# Patient Record
Sex: FEMALE | Race: WHITE | Employment: OTHER | URBAN - METROPOLITAN AREA
[De-identification: names, ages, dates, MRNs, and addresses within clinical notes are randomized per-mention and may not be internally consistent; named-entity substitution may affect disease eponyms.]

---

## 2017-09-18 DIAGNOSIS — Z30.41 SURVEILLANCE OF PREVIOUSLY PRESCRIBED CONTRACEPTIVE PILL: ICD-10-CM

## 2017-09-18 NOTE — TELEPHONE ENCOUNTER
Future Appointments  Date Time Provider Charline Vegai   10/2/2017 9:30 AM Sofía Hoffman MD [facility] [address]      LOV 8/16 Return in about 1 year (around 8/30/2017).        LAST PAP  NONE    LAST RX    Norgestimate-Eth Estradiol 0.25-35 MG-MCG Oral

## 2017-09-19 DIAGNOSIS — Z30.41 SURVEILLANCE OF PREVIOUSLY PRESCRIBED CONTRACEPTIVE PILL: ICD-10-CM

## 2017-09-19 RX ORDER — NORGESTIMATE AND ETHINYL ESTRADIOL 0.25-0.035
1 KIT ORAL DAILY
Qty: 1 PACKAGE | Refills: 0 | Status: SHIPPED | OUTPATIENT
Start: 2017-09-19 | End: 2017-10-02

## 2017-09-19 RX ORDER — NORGESTIMATE AND ETHINYL ESTRADIOL 0.25-0.035
1 KIT ORAL DAILY
Qty: 84 TABLET | Refills: 0 | OUTPATIENT
Start: 2017-09-19 | End: 2017-10-19

## 2017-09-19 NOTE — TELEPHONE ENCOUNTER
Rx request to change today's Rx to 90 days      Denied, pt has upcoming appt, will receive additional 90 days at that time    Future Appointments  Date Time Provider Charline Oropeza   10/2/2017 9:30 AM Dom Meredith MD [facility] [address]

## 2017-10-02 ENCOUNTER — OFFICE VISIT (OUTPATIENT)
Dept: FAMILY MEDICINE CLINIC | Facility: CLINIC | Age: 27
End: 2017-10-02

## 2017-10-02 VITALS
WEIGHT: 209 LBS | BODY MASS INDEX: 33.19 KG/M2 | HEIGHT: 66.5 IN | RESPIRATION RATE: 18 BRPM | SYSTOLIC BLOOD PRESSURE: 104 MMHG | DIASTOLIC BLOOD PRESSURE: 66 MMHG | HEART RATE: 86 BPM | TEMPERATURE: 99 F

## 2017-10-02 DIAGNOSIS — Z23 NEED FOR VACCINATION: ICD-10-CM

## 2017-10-02 DIAGNOSIS — Z01.419 ENCOUNTER FOR ROUTINE GYNECOLOGICAL EXAMINATION WITH PAPANICOLAOU SMEAR OF CERVIX: ICD-10-CM

## 2017-10-02 DIAGNOSIS — Z30.41 SURVEILLANCE OF PREVIOUSLY PRESCRIBED CONTRACEPTIVE PILL: ICD-10-CM

## 2017-10-02 DIAGNOSIS — M79.671 FOOT PAIN, BILATERAL: ICD-10-CM

## 2017-10-02 DIAGNOSIS — M79.672 FOOT PAIN, BILATERAL: ICD-10-CM

## 2017-10-02 DIAGNOSIS — E66.9 OBESITY (BMI 30.0-34.9): ICD-10-CM

## 2017-10-02 DIAGNOSIS — Z00.00 ROUTINE GENERAL MEDICAL EXAMINATION AT A HEALTH CARE FACILITY: Primary | ICD-10-CM

## 2017-10-02 PROCEDURE — 99395 PREV VISIT EST AGE 18-39: CPT | Performed by: FAMILY MEDICINE

## 2017-10-02 PROCEDURE — 88175 CYTOPATH C/V AUTO FLUID REDO: CPT | Performed by: FAMILY MEDICINE

## 2017-10-02 PROCEDURE — 99212 OFFICE O/P EST SF 10 MIN: CPT | Performed by: FAMILY MEDICINE

## 2017-10-02 PROCEDURE — 90471 IMMUNIZATION ADMIN: CPT | Performed by: FAMILY MEDICINE

## 2017-10-02 PROCEDURE — 90686 IIV4 VACC NO PRSV 0.5 ML IM: CPT | Performed by: FAMILY MEDICINE

## 2017-10-02 RX ORDER — NORGESTIMATE AND ETHINYL ESTRADIOL 0.25-0.035
1 KIT ORAL DAILY
Qty: 1 PACKAGE | Refills: 11 | Status: SHIPPED | OUTPATIENT
Start: 2017-10-02 | End: 2018-09-09

## 2017-10-02 NOTE — PATIENT INSTRUCTIONS
please use over the counter monistat. Prevention Guidelines, Women Ages 25 to 44  Screening tests and vaccines are an important part of managing your health. Health counseling is essential, too. Below are guidelines for these, for women ages 25 to 44.  Destiny Arnlod Toy Gum Who needs it How often   Chickenpox (varicella) All women in this age group who have no record of this infection or vaccine 2 doses; the second dose should be given 4 to 8 weeks after the first dose   Hepatitis A Women at increased risk for infect BRCA gene mutation testing for breast and ovarian cancer susceptibility Women with increased risk for having gene mutation When your risk is known   Breast cancer and chemoprevention Women at high risk for breast cancer When your risk is known   Diet and e A gram of fat has almost 2.5 times the calories of a gram of protein or carbohydrates. Try to balance your food choices so that only 20% to 35% of your calories comes from total fat.  This means an average of 2½ to 3½ grams of fat for each 100 calories you

## 2017-10-02 NOTE — PROGRESS NOTES
Merlin Valentino is a 32year old female here for Patient presents with: Well Adult: Physical only.     HPI:   Patient is seen for initial visit    Patient is seen for annual physical, pap done last year was normal  Does do self breast exam.    Needs a refil tenderness. Endocrine: No thyroid symptoms (hair, nails, weight, GI, concentration, depression). No history or symptoms of diabetes, no polyuria, polydipsia, polyphagia).   Respiratory: No cough, shortness of breath, dyspnea on exertion, wheezing, hemoptys white discharge, cervix appears normal, pap done, bimanual no CMT, no adnexal mass or tenderness. MUSCULOSKELETAL: Back and extremities within normal limits  EXTREMITIES: no cyanosis, clubbing or edema.  Peripheral pulses normal.  NEURO: Nonfocal exam. Yamil

## 2018-09-09 ENCOUNTER — TELEPHONE (OUTPATIENT)
Dept: FAMILY MEDICINE CLINIC | Facility: CLINIC | Age: 28
End: 2018-09-09

## 2018-09-09 DIAGNOSIS — Z30.41 SURVEILLANCE OF PREVIOUSLY PRESCRIBED CONTRACEPTIVE PILL: ICD-10-CM

## 2018-09-10 RX ORDER — NORGESTIMATE AND ETHINYL ESTRADIOL 0.25-0.035
KIT ORAL
Qty: 28 TABLET | Refills: 11 | Status: SHIPPED | OUTPATIENT
Start: 2018-09-10 | End: 2018-10-19

## 2018-09-10 NOTE — TELEPHONE ENCOUNTER
Name from pharmacy: [facility]         Will file in chart as: Romayne Heron 0.25-35 MG-MCG Oral Tab    Sig: TAKE 1 TABLET DAILY    Disp:  28 tablet    Refills:  0    Start: 9/9/2018     Class: Normal    For: Surveillance of previously prescribed co

## 2018-09-20 DIAGNOSIS — Z30.41 SURVEILLANCE OF PREVIOUSLY PRESCRIBED CONTRACEPTIVE PILL: ICD-10-CM

## 2018-09-21 RX ORDER — NORGESTIMATE AND ETHINYL ESTRADIOL 0.25-0.035
KIT ORAL
Qty: 28 TABLET | Refills: 0 | OUTPATIENT
Start: 2018-09-21

## 2018-09-21 NOTE — TELEPHONE ENCOUNTER
Name from pharmacy: [address] [place] [address]          Will file in chart as: Lorena Yang 0.25-35 MG-MCG Oral Tab    The source prescription was discontinued on 10/2/2017 by Maribell Mcfadden MD.    Sig: TAKE 1 TABLET BY MOUTH DAILY    Disp:  28 tablet    Refil

## 2018-09-22 DIAGNOSIS — Z30.41 SURVEILLANCE OF PREVIOUSLY PRESCRIBED CONTRACEPTIVE PILL: ICD-10-CM

## 2018-09-22 RX ORDER — NORGESTIMATE AND ETHINYL ESTRADIOL 0.25-0.035
KIT ORAL
Qty: 28 TABLET | Refills: 0 | Status: SHIPPED | OUTPATIENT
Start: 2018-09-22 | End: 2018-10-19

## 2018-09-22 NOTE — TELEPHONE ENCOUNTER
Patient called states [facility] told her they do not have a refill for her when he went to pick it up. Prescription was approved on 9/10/18 per record. Prescription resent today.

## 2018-10-19 ENCOUNTER — OFFICE VISIT (OUTPATIENT)
Dept: FAMILY MEDICINE CLINIC | Facility: CLINIC | Age: 28
End: 2018-10-19
Payer: COMMERCIAL

## 2018-10-19 VITALS
TEMPERATURE: 98 F | SYSTOLIC BLOOD PRESSURE: 118 MMHG | OXYGEN SATURATION: 98 % | DIASTOLIC BLOOD PRESSURE: 62 MMHG | WEIGHT: 197.5 LBS | HEIGHT: 66.5 IN | RESPIRATION RATE: 18 BRPM | HEART RATE: 97 BPM | BODY MASS INDEX: 31.36 KG/M2

## 2018-10-19 DIAGNOSIS — Z30.41 SURVEILLANCE OF PREVIOUSLY PRESCRIBED CONTRACEPTIVE PILL: ICD-10-CM

## 2018-10-19 DIAGNOSIS — E66.9 OBESITY (BMI 30.0-34.9): ICD-10-CM

## 2018-10-19 DIAGNOSIS — Z23 NEED FOR VACCINATION: ICD-10-CM

## 2018-10-19 DIAGNOSIS — Z00.00 ROUTINE GENERAL MEDICAL EXAMINATION AT A HEALTH CARE FACILITY: Primary | ICD-10-CM

## 2018-10-19 PROBLEM — M79.672 FOOT PAIN, BILATERAL: Status: RESOLVED | Noted: 2017-10-02 | Resolved: 2018-10-19

## 2018-10-19 PROBLEM — M79.671 FOOT PAIN, BILATERAL: Status: RESOLVED | Noted: 2017-10-02 | Resolved: 2018-10-19

## 2018-10-19 PROCEDURE — 90686 IIV4 VACC NO PRSV 0.5 ML IM: CPT | Performed by: FAMILY MEDICINE

## 2018-10-19 PROCEDURE — 90471 IMMUNIZATION ADMIN: CPT | Performed by: FAMILY MEDICINE

## 2018-10-19 PROCEDURE — 99395 PREV VISIT EST AGE 18-39: CPT | Performed by: FAMILY MEDICINE

## 2018-10-19 RX ORDER — NORGESTIMATE AND ETHINYL ESTRADIOL 0.25-0.035
1 KIT ORAL
Qty: 28 TABLET | Refills: 11 | Status: SHIPPED | OUTPATIENT
Start: 2018-10-19 | End: 2019-09-21

## 2018-10-19 NOTE — PROGRESS NOTES
Jessica Puri is a 25year old female here for Patient presents with: Well Adult: Physical only. HPI:   Patient is seen for her annual physical.  Pap done last year was normal    Needs a refill for her OCP.  Periods are regular and has no side effects symptoms (hair, nails, weight, GI, concentration, depression). No history or symptoms of diabetes, no polyuria, polydipsia, polyphagia). Respiratory: No cough, shortness of breath, dyspnea on exertion, wheezing, hemoptysis.   Cardiovascular: No heart palpi normal.  NEURO: Nonfocal exam. Oriented times three,cranial nerves are intact,motor and sensory are grossly intact, speech normal, DTR's equal bilaterally. ASSESSMENT AND PLAN:   Pedro Galloway was seen today for well adult.     Diagnoses and all orders for [facility]

## 2018-10-19 NOTE — PATIENT INSTRUCTIONS
Prevention Guidelines, Women Ages 25 to 44  Screening tests and vaccines are an important part of managing your health. A screening test is done to find possible disorders or diseases in people who don't have any symptoms.  The goal is to find a disease e Type 2 diabetes All women with prediabetes Every year   Gonorrhea Sexually active women at increased risk for infection At routine exams   Hepatitis C Anyone at increased risk At routine exams   HIV All women should be tested at least once for HIV between Meningococcal Women at increased risk for infection should talk with their healthcare provider 1 or more doses   Pneumococcal conjugate vaccine (PCV13) and pneumococcal polysaccharide vaccine (PPSV23) Women at increased risk for infection should talk with © 5925-6898 [address] [facility] [place]. All rights reserved. This information is not intended as a substitute for professional medical care. Always follow your healthcare professional's instructions.

## 2019-09-21 DIAGNOSIS — Z30.41 SURVEILLANCE OF PREVIOUSLY PRESCRIBED CONTRACEPTIVE PILL: ICD-10-CM

## 2019-09-23 ENCOUNTER — TELEPHONE (OUTPATIENT)
Dept: FAMILY MEDICINE CLINIC | Facility: CLINIC | Age: 29
End: 2019-09-23

## 2019-09-23 RX ORDER — NORGESTIMATE AND ETHINYL ESTRADIOL 0.25-0.035
KIT ORAL
Qty: 28 TABLET | Refills: 1 | Status: SHIPPED | OUTPATIENT
Start: 2019-09-23 | End: 2019-10-25

## 2019-09-23 NOTE — TELEPHONE ENCOUNTER
Pt needs appt for annual WWE  Last seen     No future appointments.   Please contact pt to schedule appt

## 2019-09-23 NOTE — TELEPHONE ENCOUNTER
Gynecology Medication Protocol Passed9/21 1:36 AM   PASS-PENDING LAST PAP WNL--VIA MANUAL LOOKUP    Physical or Pelvic/Breast in past 12 or next 3 mos--VIA MANUAL LOOKUP     Last OV 10-19-18    No future appointments.

## 2019-09-23 NOTE — TELEPHONE ENCOUNTER
Camryn Mccarty           9/23/19 3:30 PM   Note      Pt called - scheduled her yearly physical for 10/25/19. Pt requesting her birth control get filled with enough to hold her over to that appointment.           Future Appointments   Date Time Provider D

## 2019-09-23 NOTE — TELEPHONE ENCOUNTER
Pt called - scheduled her yearly physical for 10/25/19. Pt requesting her birth control get filled with enough to hold her over to that appointment.

## 2019-10-25 ENCOUNTER — OFFICE VISIT (OUTPATIENT)
Dept: FAMILY MEDICINE CLINIC | Facility: CLINIC | Age: 29
End: 2019-10-25
Payer: COMMERCIAL

## 2019-10-25 VITALS
OXYGEN SATURATION: 98 % | TEMPERATURE: 98 F | BODY MASS INDEX: 34.94 KG/M2 | SYSTOLIC BLOOD PRESSURE: 98 MMHG | HEIGHT: 66.5 IN | HEART RATE: 77 BPM | DIASTOLIC BLOOD PRESSURE: 66 MMHG | RESPIRATION RATE: 18 BRPM | WEIGHT: 220 LBS

## 2019-10-25 DIAGNOSIS — Z30.41 SURVEILLANCE OF PREVIOUSLY PRESCRIBED CONTRACEPTIVE PILL: ICD-10-CM

## 2019-10-25 DIAGNOSIS — Z00.00 ROUTINE GENERAL MEDICAL EXAMINATION AT A HEALTH CARE FACILITY: Primary | ICD-10-CM

## 2019-10-25 DIAGNOSIS — E66.9 OBESITY (BMI 30.0-34.9): ICD-10-CM

## 2019-10-25 PROCEDURE — 99395 PREV VISIT EST AGE 18-39: CPT | Performed by: FAMILY MEDICINE

## 2019-10-25 RX ORDER — NORGESTIMATE AND ETHINYL ESTRADIOL 0.25-0.035
1 KIT ORAL
Qty: 28 TABLET | Refills: 1 | Status: SHIPPED | OUTPATIENT
Start: 2019-10-25 | End: 2019-12-27

## 2019-10-25 NOTE — PATIENT INSTRUCTIONS
Please continue your current birth control pill for 2 more months, if you feel your mood swings have not gotten better please call and I will send in a prescription for different birth control pill for you to try.     Prevention Guidelines, Women Ages 25 to Type 2 diabetes, prediabetes All women with no symptoms who are overweight or obese and have 1 or more other risk factors for diabetes At least every 3 years.  Also, testing for diabetes during pregnancy after the 24th week.    Type 2 diabetes, prediabetes Human papillomavirus (HPV) All women in this age group up to age 32 3 doses; the second dose should be given 1 to 2 months after the first dose and the third dose given 6 months after the first dose   Influenza (flu) All women in this age group Once a year 1 According to the ACS, women ages 21 to 44 years should have a clinical breast exam (CBE) as part of their routine health exam every 3 years. Breast self-exams are an option for women starting in their 25s.  But the USPSTF does not recommend CBE.   Date Las

## 2019-10-25 NOTE — PROGRESS NOTES
Sienna Cruz is a 30year old female here for Patient presents with: Well Adult: Physical only. HPI:   Patient is seen for her annual physical.  Not due for her pap until next year, all previous paps have been normal    Needs a refill for her OCP.   P pain, nosebleed or sore throat. Heme/lymph: No unusual bleeding or bruising, no lymph node enlargement or tenderness. Endocrine: No thyroid symptoms (hair, nails, weight, GI, concentration, depression).  No history or symptoms of diabetes, no polyuria, p tenderness. MUSCULOSKELETAL: Back and extremities within normal limits  EXTREMITIES: no cyanosis, clubbing or edema.  Peripheral pulses normal.  NEURO: Nonfocal exam. Oriented times three,cranial nerves are intact,motor and sensory are grossly intact, sheldone

## 2019-12-27 DIAGNOSIS — Z30.41 SURVEILLANCE OF PREVIOUSLY PRESCRIBED CONTRACEPTIVE PILL: ICD-10-CM

## 2019-12-27 RX ORDER — NORGESTIMATE AND ETHINYL ESTRADIOL 0.25-0.035
1 KIT ORAL
Qty: 28 TABLET | Refills: 9 | Status: SHIPPED | OUTPATIENT
Start: 2019-12-27

## 2019-12-27 NOTE — TELEPHONE ENCOUNTER
Called patient to see if the mood swings she reported at [address] 10/25/19 have improved. States they are gone and would like to continue with the same brand of OCPs.       \"Please continue your current birth control pill for 2 more months, if you feel your moo

## 2020-10-29 ENCOUNTER — TELEPHONE (OUTPATIENT)
Dept: FAMILY MEDICINE CLINIC | Facility: CLINIC | Age: 30
End: 2020-10-29

## 2020-10-29 ENCOUNTER — APPOINTMENT (OUTPATIENT)
Dept: LAB | Age: 30
End: 2020-10-29
Attending: FAMILY MEDICINE
Payer: COMMERCIAL

## 2020-10-29 DIAGNOSIS — Z20.822 ENCOUNTER FOR LABORATORY TESTING FOR COVID-19 VIRUS: Primary | ICD-10-CM

## 2020-10-29 DIAGNOSIS — Z20.822 ENCOUNTER FOR LABORATORY TESTING FOR COVID-19 VIRUS: ICD-10-CM

## 2020-10-29 NOTE — TELEPHONE ENCOUNTER
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 She sprayed the Lysol to disinfect but was not able to smell. As of this morning still no smell.     Requesting a Covid Rapid Test.

## 2020-10-29 NOTE — TELEPHONE ENCOUNTER
Called patient and informed Covid test has been ordered and reviewed she will get call from [facility]. Informed Dr. Lana Thomas would like to have a follow up Video Visit with her. Declines scheduling visit.   Is concerned about cost and loss of wor

## 2020-10-29 NOTE — TELEPHONE ENCOUNTER
Called patient who states she noticed last evening she could not smell the Lysol. Is able to taste. Noticed yesterday afternoon she felt a little stuffy. Denies fever, chills, runny nose, cough, SOB, CP/tightness, N/V/D, abd pain or rashes.   States blanca

## 2020-10-29 NOTE — TELEPHONE ENCOUNTER
Pt called stating she spoke to nurse thi morning. Wondering why she has not heard back from the Dr.      Informed her Dr is currently with Patients.

## 2020-11-01 ENCOUNTER — TELEPHONE (OUTPATIENT)
Dept: ADMINISTRATIVE | Facility: HOSPITAL | Age: 30
End: 2020-11-01

## 2020-11-01 NOTE — TELEPHONE ENCOUNTER
Spoke with patient and confirmed identity (verified name and ). Informed patient that the COVID 19 test results are POSITIVE.  Patient directed to isolate away form any household members as much as possible and the general public COMPLETELY for 10 days t Telephone Encounter by Jenelle Dhillon MD at 10/17/17 03:42 PM     Author:  Jenelle Dhillon MD Service:  (none) Author Type:  Physician     Filed:  10/17/17 03:42 PM Encounter Date:  10/17/2017 Status:  Signed     :  Jenelle Dhillon MD (Physician)            Please place tickler for colonoscopy in 3 yrs[RA1.1T]        Revision History        User Key Date/Time User Provider Type Action    > RA1.1 10/17/17 03:42 PM Jenelle Dhillon MD Physician Sign    T - Template

## 2024-01-16 ENCOUNTER — PATIENT OUTREACH (OUTPATIENT)
Dept: FAMILY MEDICINE CLINIC | Facility: CLINIC | Age: 34
End: 2024-01-16

## 2024-01-17 ENCOUNTER — PATIENT OUTREACH (OUTPATIENT)
Dept: FAMILY MEDICINE CLINIC | Facility: CLINIC | Age: 34
End: 2024-01-17

## 2024-01-17 ENCOUNTER — PATIENT OUTREACH (OUTPATIENT)
Dept: CASE MANAGEMENT | Age: 34
End: 2024-01-17

## 2024-01-17 NOTE — PROCEDURES
The office order for PCP removal request is Approved and finalized on January 17, 2024.    Thanks,  [facility] Team

## (undated) NOTE — LETTER
11/26/19        Juan Llamas  [address] [place] [address]      Dear Emilee Luciano,    [address] [facility] records indicate that you have outstanding lab work and or testing that was ordered for you and has not yet been completed:  Orders Placed This Encounter

## (undated) NOTE — LETTER
11/30/18        [address] [facility] [address] [facility] [address]      Dear Zachery Escobar,    [address] [facility] records indicate that you have outstanding lab work and or testing that was ordered for you and has not yet been completed:  Orders Placed This [place]

## (undated) NOTE — LETTER
11/02/17        [address] [facility] [address]      Dear Herbert Wynne,    [address] [facility] records indicate that you have outstanding lab work and or testing that was ordered for you and has not yet been completed:          SHAYNA AIKEN Different